# Patient Record
Sex: MALE | Race: BLACK OR AFRICAN AMERICAN | NOT HISPANIC OR LATINO | URBAN - METROPOLITAN AREA
[De-identification: names, ages, dates, MRNs, and addresses within clinical notes are randomized per-mention and may not be internally consistent; named-entity substitution may affect disease eponyms.]

---

## 2023-03-13 ENCOUNTER — SOCIAL WORK (OUTPATIENT)
Dept: ADMINISTRATIVE | Facility: OTHER | Age: 68
End: 2023-03-13

## 2023-03-13 NOTE — PROGRESS NOTES
Sw received a referral to assist with [facility] resources. [facility] had received a consult to see Patient. After reviewing the referral, it was recommended he be referred to [facility]. Sw mailed Patient the contact information for [facility]. He was encouraged to contact them to schedule an assessment.    Nina Rice LCSW    [phone]

## 2023-04-03 PROBLEM — Z98.890 S/P CRANIOTOMY: Status: ACTIVE | Noted: 2019-05-24

## 2023-04-03 PROBLEM — R53.81 DEBILITY: Status: ACTIVE | Noted: 2019-05-23

## 2023-04-03 PROBLEM — E83.42 HYPOMAGNESEMIA: Status: ACTIVE | Noted: 2019-05-24

## 2023-04-03 PROBLEM — Z74.1 REQUIRES DAILY ASSISTANCE FOR ACTIVITIES OF DAILY LIVING (ADL) AND COMFORT NEEDS: Status: ACTIVE | Noted: 2021-04-30

## 2023-04-03 PROBLEM — R00.1 BRADYCARDIA: Status: ACTIVE | Noted: 2020-06-25

## 2023-04-03 PROBLEM — R29.6 RECURRENT FALLS: Status: ACTIVE | Noted: 2021-02-05

## 2023-04-03 PROBLEM — S72.142A CLOSED 2-PART INTERTROCHANTERIC FRACTURE OF LEFT FEMUR: Status: ACTIVE | Noted: 2021-02-05

## 2023-04-03 PROBLEM — R46.0 POOR HYGIENE: Status: ACTIVE | Noted: 2021-04-30

## 2023-04-03 PROBLEM — R39.14 BENIGN PROSTATIC HYPERPLASIA WITH INCOMPLETE BLADDER EMPTYING: Status: ACTIVE | Noted: 2019-05-23

## 2023-04-03 PROBLEM — N40.1 BENIGN PROSTATIC HYPERPLASIA WITH INCOMPLETE BLADDER EMPTYING: Status: ACTIVE | Noted: 2019-05-23

## 2023-04-05 PROBLEM — M25.522 LEFT ELBOW PAIN: Status: ACTIVE | Noted: 2023-04-05

## 2023-06-14 PROBLEM — R29.898 WEAKNESS OF BOTH LOWER EXTREMITIES: Status: ACTIVE | Noted: 2023-06-14
